# Patient Record
Sex: FEMALE | Race: WHITE | Employment: UNEMPLOYED | URBAN - METROPOLITAN AREA
[De-identification: names, ages, dates, MRNs, and addresses within clinical notes are randomized per-mention and may not be internally consistent; named-entity substitution may affect disease eponyms.]

---

## 2017-02-07 ENCOUNTER — HOSPITAL ENCOUNTER (OUTPATIENT)
Dept: GENERAL RADIOLOGY | Age: 62
Discharge: HOME | End: 2017-02-07
Attending: INTERNAL MEDICINE | Admitting: INTERNAL MEDICINE

## 2017-02-07 ENCOUNTER — OFFICE VISIT (OUTPATIENT)
Age: 62
End: 2017-02-07

## 2017-02-07 VITALS
BODY MASS INDEX: 21.19 KG/M2 | WEIGHT: 119.6 LBS | HEIGHT: 63 IN | SYSTOLIC BLOOD PRESSURE: 108 MMHG | DIASTOLIC BLOOD PRESSURE: 64 MMHG

## 2017-02-07 DIAGNOSIS — M81.0 OSTEOPOROSIS, POSTMENOPAUSAL: Primary | ICD-10-CM

## 2017-02-07 DIAGNOSIS — M81.0 OSTEOPOROSIS, POSTMENOPAUSAL: ICD-10-CM

## 2017-02-07 DIAGNOSIS — Z51.81 MEDICATION MONITORING ENCOUNTER: ICD-10-CM

## 2017-02-07 PROCEDURE — 99214 OFFICE O/P EST MOD 30 MIN: CPT | Performed by: INTERNAL MEDICINE

## 2017-02-07 PROCEDURE — 77080 DXA BONE DENSITY AXIAL: CPT | Performed by: INTERNAL MEDICINE

## 2017-02-07 PROCEDURE — 96372 THER/PROPH/DIAG INJ SC/IM: CPT | Performed by: INTERNAL MEDICINE

## 2017-02-08 ENCOUNTER — PROCEDURE VISIT (OUTPATIENT)
Age: 62
End: 2017-02-08

## 2017-02-08 DIAGNOSIS — M81.0 OSTEOPOROSIS, POSTMENOPAUSAL: Primary | ICD-10-CM

## 2017-07-21 ENCOUNTER — TELEPHONE (OUTPATIENT)
Age: 62
End: 2017-07-21

## 2017-08-09 ENCOUNTER — NURSE ONLY (OUTPATIENT)
Age: 62
End: 2017-08-09

## 2017-08-09 DIAGNOSIS — M81.0 OSTEOPOROSIS, POSTMENOPAUSAL: Primary | ICD-10-CM

## 2017-08-09 PROCEDURE — 96372 THER/PROPH/DIAG INJ SC/IM: CPT | Performed by: INTERNAL MEDICINE

## 2018-02-16 DIAGNOSIS — M81.0 OSTEOPOROSIS, POSTMENOPAUSAL: Primary | ICD-10-CM

## 2018-02-19 ENCOUNTER — TELEPHONE (OUTPATIENT)
Dept: ENDOCRINOLOGY | Age: 63
End: 2018-02-19

## 2018-02-19 NOTE — TELEPHONE ENCOUNTER
Pt called to camille her Dexa  For tomorrow 2/20/18 to 3-5-18 as she forgot about other appointments she had scheduled.

## 2018-03-05 ENCOUNTER — PROCEDURE VISIT (OUTPATIENT)
Age: 63
End: 2018-03-05

## 2018-03-05 ENCOUNTER — OFFICE VISIT (OUTPATIENT)
Age: 63
End: 2018-03-05

## 2018-03-05 ENCOUNTER — HOSPITAL ENCOUNTER (OUTPATIENT)
Dept: GENERAL RADIOLOGY | Age: 63
Discharge: HOME | End: 2018-03-05
Admitting: INTERNAL MEDICINE

## 2018-03-05 VITALS
BODY MASS INDEX: 21.72 KG/M2 | DIASTOLIC BLOOD PRESSURE: 66 MMHG | SYSTOLIC BLOOD PRESSURE: 108 MMHG | HEIGHT: 63 IN | WEIGHT: 122.6 LBS

## 2018-03-05 DIAGNOSIS — M81.0 OSTEOPOROSIS, POSTMENOPAUSAL: ICD-10-CM

## 2018-03-05 DIAGNOSIS — Z51.81 MEDICATION MONITORING ENCOUNTER: ICD-10-CM

## 2018-03-05 DIAGNOSIS — M81.0 OSTEOPOROSIS, POSTMENOPAUSAL: Primary | ICD-10-CM

## 2018-03-05 PROCEDURE — 96372 THER/PROPH/DIAG INJ SC/IM: CPT | Performed by: INTERNAL MEDICINE

## 2018-03-05 PROCEDURE — 77080 DXA BONE DENSITY AXIAL: CPT | Performed by: INTERNAL MEDICINE

## 2018-03-05 PROCEDURE — 99214 OFFICE O/P EST MOD 30 MIN: CPT | Performed by: INTERNAL MEDICINE

## 2018-03-05 NOTE — PROGRESS NOTES
[address] [place] [address] Osteoporosis and 103 [facility], [address],Suite [facility] [address]  Phone [phone]  Fax [phone]    PATIENT NAME: Faizan Guerrero  YOB: 1955  INITIAL CONSULTATION: 08/21/2006  LAST OFFICE VISIT: 02/07/2017  TODAY'S VISIT: 03/05/2018    Labs @  12/2017     [email or web address]    2.5% lidocaine and 2.5% prilocaine cream    PROBLEMS:   Osteoporosis by DXA done 10/28/2005, lowest T-score - 2.5 in the spine (L2-L4)    Foot fractures ages 20 and 36 (1974, 0), falls during strenuous movement (judo, jumping)    VFA 10/2005 showed mild wedge deformity of L1    Left humerus fracture 04/2010 (hard fracture on pavement)    BMD decreased between 2005 and 2007 despite treatment with Actonel  Natural menopause age 47 (2005)  Asperger syndrome, increased sensitivity to stimuli (especially needles)  Seasonal allergies  Osteoarthritis of the knees  Bursitis of the hips   NEW 2015: GERD usually controlled with famotidine and/or Tums    CURRENT MANAGEMENT FOR BONE HEALTH:   Calcium, diet 900-1200 mg/d + multivitamin 600 mg/d = 2540-5779 mg/d  Vitamin D, 400 IU with calcium + 400 IU with multivitamin + 1000 IU/d = 1800 IU/d    25-OH vitamin D 64 ng/mL 01/2013  Exercise, 2-3 days per week, either 30 minute walks and/or 70 minute dancing sessions  Pharmacologic therapy, Prolia 60 mg SQ twice yearly started 01/2016    PREVIOUS MEDICATIONS FOR OSTEOPOROSIS:   Actonel 35 mg weekly started 02/2006, stopped because BMD decreased while on Actonel  Forteo 01/2008-01/2010, finished 2-year course  Actonel 150 mg monthly 01/2010-10/2011, changed to alendronate for lower cost  Alendronate 70 mg weekly 10/2011-01/2016, changed to Prolia because BMD decreased    OTHER CURRENT MEDICATIONS (SELECTED): Nasacort  OTC MEDICATIONS: Aleve, loratidine, OsteoBiflex, vitamin C, vitamin E, vitamin A, fish oil concentrate, Super Digestaway, lactase    CHIEF

## 2018-08-31 DIAGNOSIS — M81.0 OSTEOPOROSIS, POSTMENOPAUSAL: Primary | ICD-10-CM

## 2018-09-19 ENCOUNTER — NURSE ONLY (OUTPATIENT)
Age: 63
End: 2018-09-19

## 2018-09-19 DIAGNOSIS — M81.0 OSTEOPOROSIS, POSTMENOPAUSAL: Primary | ICD-10-CM

## 2018-09-19 PROCEDURE — 96372 THER/PROPH/DIAG INJ SC/IM: CPT | Performed by: INTERNAL MEDICINE

## 2018-09-19 NOTE — PROGRESS NOTES
Informed patient if any signs of redness,rash,swelling or unusual symptoms occur, please contact the office. Prolia given per physician order. Prolia supplied by the physician office. It is the patient's responsibility to notify the physician office of new insurance plan prior to appointment to prevent delay in treatment. Please send a copy of the front and back of the insurance card either by fax, mail or stop by the office.

## 2019-03-26 ENCOUNTER — HOSPITAL ENCOUNTER (OUTPATIENT)
Dept: GENERAL RADIOLOGY | Age: 64
Discharge: HOME | End: 2019-03-26
Payer: COMMERCIAL

## 2019-03-26 ENCOUNTER — PROCEDURE VISIT (OUTPATIENT)
Dept: ENDOCRINOLOGY | Age: 64
End: 2019-03-26
Payer: COMMERCIAL

## 2019-03-26 ENCOUNTER — OFFICE VISIT (OUTPATIENT)
Dept: ENDOCRINOLOGY | Age: 64
End: 2019-03-26
Payer: COMMERCIAL

## 2019-03-26 VITALS
HEART RATE: 73 BPM | HEIGHT: 63 IN | SYSTOLIC BLOOD PRESSURE: 109 MMHG | DIASTOLIC BLOOD PRESSURE: 69 MMHG | WEIGHT: 118.8 LBS | OXYGEN SATURATION: 99 % | BODY MASS INDEX: 21.05 KG/M2

## 2019-03-26 DIAGNOSIS — M81.0 OSTEOPOROSIS, POSTMENOPAUSAL: ICD-10-CM

## 2019-03-26 DIAGNOSIS — M81.0 OSTEOPOROSIS, POSTMENOPAUSAL: Primary | ICD-10-CM

## 2019-03-26 DIAGNOSIS — Z51.81 MEDICATION MONITORING ENCOUNTER: ICD-10-CM

## 2019-03-26 PROCEDURE — 77080 DXA BONE DENSITY AXIAL: CPT | Performed by: INTERNAL MEDICINE

## 2019-03-26 PROCEDURE — 77080 DXA BONE DENSITY AXIAL: CPT

## 2019-03-26 PROCEDURE — 96372 THER/PROPH/DIAG INJ SC/IM: CPT | Performed by: INTERNAL MEDICINE

## 2019-03-26 PROCEDURE — 99214 OFFICE O/P EST MOD 30 MIN: CPT | Performed by: INTERNAL MEDICINE

## 2019-04-05 ENCOUNTER — TELEPHONE (OUTPATIENT)
Dept: ENDOCRINOLOGY | Age: 64
End: 2019-04-05

## 2019-04-05 NOTE — TELEPHONE ENCOUNTER
Patient wanted to know if it was decided whether she needed blood work or not. This patient will not answer on the phone until the office speaks first, because of scam calls. So when calling this patient, please say Dr. Reji Hopkins office and they will answer.

## 2019-04-08 NOTE — TELEPHONE ENCOUNTER
Left message for the patient to call the office related to lab orders which were placed on 3/2019. The office can mail to home or patient can go to any [facility] facility and the orders are in epic.

## 2019-04-08 NOTE — TELEPHONE ENCOUNTER
Patient will call Dr. Gagan Dkue office to have them fax over the most recent lab results.  Office fax number [phone] given

## 2019-04-08 NOTE — TELEPHONE ENCOUNTER
Patient returning call. Patient said she had some labs done for Dr. Emmy Connolly and wanted to know if these were sufficient for Dr. Allan Rowe because she has a needle aversion.

## 2019-05-16 LAB
ALBUMIN SERPL-MCNC: 4.3 G/DL (ref 3.5–5.7)
ANION GAP SERPL CALCULATED.3IONS-SCNC: 7 MMOL/L (ref 3–16)
BUN BLDV-MCNC: 19 MG/DL (ref 7–25)
CALCIUM SERPL-MCNC: 9.3 MG/DL (ref 8.6–10.3)
CHLORIDE BLD-SCNC: 104 MMOL/L (ref 98–110)
CO2: 27 MMOL/L (ref 21–33)
CREAT SERPL-MCNC: 0.64 MG/DL (ref 0.6–1.3)
GFR, ESTIMATED: >90 SEE NOTE.
GFR, ESTIMATED: >90 SEE NOTE.
GLUCOSE BLD-MCNC: 91 MG/DL (ref 70–100)
OSMOLALITY CALCULATION: 288 MOSM/KG (ref 278–305)
PHOSPHORUS: 3.7 MG/DL (ref 2.1–4.7)
POTASSIUM SERPL-SCNC: 4.5 MMOL/L (ref 3.5–5.3)
SODIUM BLD-SCNC: 138 MMOL/L (ref 133–146)

## 2019-05-17 LAB — VITAMIN D 25-HYDROXY: 63 NG/ML (ref 30–100)

## 2019-10-01 ENCOUNTER — TELEPHONE (OUTPATIENT)
Dept: ENDOCRINOLOGY | Age: 64
End: 2019-10-01

## 2019-10-02 ENCOUNTER — NURSE ONLY (OUTPATIENT)
Dept: ENDOCRINOLOGY | Age: 64
End: 2019-10-02
Payer: COMMERCIAL

## 2019-10-02 DIAGNOSIS — M81.0 OSTEOPOROSIS, POSTMENOPAUSAL: ICD-10-CM

## 2019-10-02 PROCEDURE — 96372 THER/PROPH/DIAG INJ SC/IM: CPT | Performed by: INTERNAL MEDICINE

## 2019-12-26 ENCOUNTER — ANESTHESIA (OUTPATIENT)
Dept: OPERATING ROOM | Age: 64
End: 2019-12-26
Payer: MEDICARE

## 2019-12-26 ENCOUNTER — HOSPITAL ENCOUNTER (OUTPATIENT)
Age: 64
Setting detail: OUTPATIENT SURGERY
Discharge: HOME | End: 2019-12-26
Attending: OPHTHALMOLOGY | Admitting: OPHTHALMOLOGY
Payer: MEDICARE

## 2019-12-26 ENCOUNTER — ANESTHESIA EVENT (OUTPATIENT)
Dept: OPERATING ROOM | Age: 64
End: 2019-12-26
Payer: MEDICARE

## 2019-12-26 VITALS
HEIGHT: 62 IN | HEART RATE: 68 BPM | WEIGHT: 118 LBS | TEMPERATURE: 97.5 F | DIASTOLIC BLOOD PRESSURE: 80 MMHG | RESPIRATION RATE: 14 BRPM | SYSTOLIC BLOOD PRESSURE: 136 MMHG | OXYGEN SATURATION: 99 % | BODY MASS INDEX: 21.71 KG/M2

## 2019-12-26 VITALS — OXYGEN SATURATION: 100 % | SYSTOLIC BLOOD PRESSURE: 109 MMHG | DIASTOLIC BLOOD PRESSURE: 75 MMHG

## 2019-12-26 PROCEDURE — 2500000003 HC RX 250 WO HCPCS: Performed by: NURSE ANESTHETIST, CERTIFIED REGISTERED

## 2019-12-26 PROCEDURE — 6360000002 HC RX W HCPCS: Performed by: NURSE ANESTHETIST, CERTIFIED REGISTERED

## 2019-12-26 PROCEDURE — 3600000003 HC SURGERY LEVEL 3 BASE: Performed by: OPHTHALMOLOGY

## 2019-12-26 PROCEDURE — 2580000003 HC RX 258: Performed by: NURSE ANESTHETIST, CERTIFIED REGISTERED

## 2019-12-26 PROCEDURE — 2500000003 HC RX 250 WO HCPCS: Performed by: OPHTHALMOLOGY

## 2019-12-26 PROCEDURE — 3700000000 HC ANESTHESIA ATTENDED CARE: Performed by: OPHTHALMOLOGY

## 2019-12-26 PROCEDURE — 6370000000 HC RX 637 (ALT 250 FOR IP): Performed by: OPHTHALMOLOGY

## 2019-12-26 PROCEDURE — 3700000001 HC ADD 15 MINUTES (ANESTHESIA): Performed by: OPHTHALMOLOGY

## 2019-12-26 PROCEDURE — V2632 POST CHMBR INTRAOCULAR LENS: HCPCS | Performed by: OPHTHALMOLOGY

## 2019-12-26 PROCEDURE — 6360000002 HC RX W HCPCS: Performed by: OPHTHALMOLOGY

## 2019-12-26 PROCEDURE — 7100000011 HC PHASE II RECOVERY - ADDTL 15 MIN: Performed by: OPHTHALMOLOGY

## 2019-12-26 PROCEDURE — 2580000003 HC RX 258: Performed by: ANESTHESIOLOGY

## 2019-12-26 PROCEDURE — 3600000013 HC SURGERY LEVEL 3 ADDTL 15MIN: Performed by: OPHTHALMOLOGY

## 2019-12-26 PROCEDURE — 7100000010 HC PHASE II RECOVERY - FIRST 15 MIN: Performed by: OPHTHALMOLOGY

## 2019-12-26 PROCEDURE — 2709999900 HC NON-CHARGEABLE SUPPLY: Performed by: OPHTHALMOLOGY

## 2019-12-26 DEVICE — ACRYSOF(R) IQ ASPHERIC IOL SP ACRYLIC FOLDABLELENS WULTRASERT(TM) DELIVERY SYSTEM UV WBLUE LIGHT FILTER. 13.0MM LENGTH 6.0MM ANTERIORASYMMETRIC BICONVEX OPTIC PLANAR HAPTICS.
Type: IMPLANTABLE DEVICE | Site: EYE | Status: FUNCTIONAL
Brand: ACRYSOF ULTRASERT

## 2019-12-26 RX ORDER — SODIUM CHLORIDE, SODIUM LACTATE, POTASSIUM CHLORIDE, CALCIUM CHLORIDE 600; 310; 30; 20 MG/100ML; MG/100ML; MG/100ML; MG/100ML
INJECTION, SOLUTION INTRAVENOUS CONTINUOUS PRN
Status: DISCONTINUED | OUTPATIENT
Start: 2019-12-26 | End: 2019-12-26 | Stop reason: SDUPTHER

## 2019-12-26 RX ORDER — SODIUM CHLORIDE, POTASSIUM CHLORIDE, CALCIUM CHLORIDE, MAGNESIUM CHLORIDE, SODIUM ACETATE, AND SODIUM CITRATE 6.4; .75; .48; .3; 3.9; 1.7 MG/ML; MG/ML; MG/ML; MG/ML; MG/ML; MG/ML
SOLUTION IRRIGATION PRN
Status: DISCONTINUED | OUTPATIENT
Start: 2019-12-26 | End: 2019-12-26 | Stop reason: ALTCHOICE

## 2019-12-26 RX ORDER — PREDNISOLONE ACETATE 10 MG/ML
SUSPENSION/ DROPS OPHTHALMIC PRN
Status: DISCONTINUED | OUTPATIENT
Start: 2019-12-26 | End: 2019-12-26 | Stop reason: ALTCHOICE

## 2019-12-26 RX ORDER — ONDANSETRON 2 MG/ML
4 INJECTION INTRAMUSCULAR; INTRAVENOUS PRN
Status: DISCONTINUED | OUTPATIENT
Start: 2019-12-26 | End: 2019-12-26 | Stop reason: HOSPADM

## 2019-12-26 RX ORDER — LIDOCAINE HYDROCHLORIDE 20 MG/ML
INJECTION, SOLUTION INFILTRATION; PERINEURAL PRN
Status: DISCONTINUED | OUTPATIENT
Start: 2019-12-26 | End: 2019-12-26 | Stop reason: SDUPTHER

## 2019-12-26 RX ORDER — PILOCARPINE HYDROCHLORIDE 20 MG/ML
SOLUTION/ DROPS OPHTHALMIC PRN
Status: DISCONTINUED | OUTPATIENT
Start: 2019-12-26 | End: 2019-12-26 | Stop reason: ALTCHOICE

## 2019-12-26 RX ORDER — DIPHENHYDRAMINE HYDROCHLORIDE 50 MG/ML
12.5 INJECTION INTRAMUSCULAR; INTRAVENOUS
Status: DISCONTINUED | OUTPATIENT
Start: 2019-12-26 | End: 2019-12-26 | Stop reason: HOSPADM

## 2019-12-26 RX ORDER — TOBRAMYCIN AND DEXAMETHASONE 3; 1 MG/ML; MG/ML
1 SUSPENSION/ DROPS OPHTHALMIC CONTINUOUS
Status: DISCONTINUED | OUTPATIENT
Start: 2019-12-26 | End: 2019-12-26 | Stop reason: HOSPADM

## 2019-12-26 RX ORDER — GUAIFENESIN AND DEXTROMETHORPHAN HYDROBROMIDE 100; 10 MG/5ML; MG/5ML
10 SOLUTION ORAL EVERY 4 HOURS PRN
COMMUNITY

## 2019-12-26 RX ORDER — SODIUM CHLORIDE, SODIUM LACTATE, POTASSIUM CHLORIDE, CALCIUM CHLORIDE 600; 310; 30; 20 MG/100ML; MG/100ML; MG/100ML; MG/100ML
INJECTION, SOLUTION INTRAVENOUS CONTINUOUS
Status: DISCONTINUED | OUTPATIENT
Start: 2019-12-26 | End: 2019-12-26 | Stop reason: HOSPADM

## 2019-12-26 RX ORDER — PROPOFOL 10 MG/ML
INJECTION, EMULSION INTRAVENOUS PRN
Status: DISCONTINUED | OUTPATIENT
Start: 2019-12-26 | End: 2019-12-26 | Stop reason: SDUPTHER

## 2019-12-26 RX ORDER — OXYCODONE HYDROCHLORIDE AND ACETAMINOPHEN 5; 325 MG/1; MG/1
2 TABLET ORAL PRN
Status: DISCONTINUED | OUTPATIENT
Start: 2019-12-26 | End: 2019-12-26 | Stop reason: HOSPADM

## 2019-12-26 RX ORDER — HYDRALAZINE HYDROCHLORIDE 20 MG/ML
5 INJECTION INTRAMUSCULAR; INTRAVENOUS EVERY 10 MIN PRN
Status: DISCONTINUED | OUTPATIENT
Start: 2019-12-26 | End: 2019-12-26 | Stop reason: HOSPADM

## 2019-12-26 RX ORDER — PREDNISOLONE ACETATE 10 MG/ML
1 SUSPENSION/ DROPS OPHTHALMIC CONTINUOUS
Status: DISCONTINUED | OUTPATIENT
Start: 2019-12-26 | End: 2019-12-26 | Stop reason: HOSPADM

## 2019-12-26 RX ORDER — MORPHINE SULFATE 10 MG/ML
2 INJECTION, SOLUTION INTRAMUSCULAR; INTRAVENOUS EVERY 5 MIN PRN
Status: DISCONTINUED | OUTPATIENT
Start: 2019-12-26 | End: 2019-12-26 | Stop reason: HOSPADM

## 2019-12-26 RX ORDER — LIDOCAINE HYDROCHLORIDE 10 MG/ML
2 INJECTION, SOLUTION INFILTRATION; PERINEURAL
Status: DISCONTINUED | OUTPATIENT
Start: 2019-12-26 | End: 2019-12-26 | Stop reason: HOSPADM

## 2019-12-26 RX ORDER — TOBRAMYCIN AND DEXAMETHASONE 3; 1 MG/ML; MG/ML
SUSPENSION/ DROPS OPHTHALMIC PRN
Status: DISCONTINUED | OUTPATIENT
Start: 2019-12-26 | End: 2019-12-26 | Stop reason: ALTCHOICE

## 2019-12-26 RX ORDER — MORPHINE SULFATE 10 MG/ML
1 INJECTION, SOLUTION INTRAMUSCULAR; INTRAVENOUS EVERY 5 MIN PRN
Status: DISCONTINUED | OUTPATIENT
Start: 2019-12-26 | End: 2019-12-26 | Stop reason: HOSPADM

## 2019-12-26 RX ORDER — TETRACAINE HYDROCHLORIDE 5 MG/ML
SOLUTION OPHTHALMIC PRN
Status: DISCONTINUED | OUTPATIENT
Start: 2019-12-26 | End: 2019-12-26 | Stop reason: ALTCHOICE

## 2019-12-26 RX ORDER — OXYCODONE HYDROCHLORIDE AND ACETAMINOPHEN 5; 325 MG/1; MG/1
1 TABLET ORAL PRN
Status: DISCONTINUED | OUTPATIENT
Start: 2019-12-26 | End: 2019-12-26 | Stop reason: HOSPADM

## 2019-12-26 RX ORDER — PROMETHAZINE HYDROCHLORIDE 25 MG/ML
6.25 INJECTION, SOLUTION INTRAMUSCULAR; INTRAVENOUS
Status: DISCONTINUED | OUTPATIENT
Start: 2019-12-26 | End: 2019-12-26 | Stop reason: HOSPADM

## 2019-12-26 RX ORDER — LABETALOL HYDROCHLORIDE 5 MG/ML
5 INJECTION, SOLUTION INTRAVENOUS EVERY 10 MIN PRN
Status: DISCONTINUED | OUTPATIENT
Start: 2019-12-26 | End: 2019-12-26 | Stop reason: HOSPADM

## 2019-12-26 RX ADMIN — Medication 0.3 ML: at 11:30

## 2019-12-26 RX ADMIN — LIDOCAINE HYDROCHLORIDE 30 MG: 20 INJECTION, SOLUTION INFILTRATION; PERINEURAL at 11:48

## 2019-12-26 RX ADMIN — Medication 0.3 ML: at 11:37

## 2019-12-26 RX ADMIN — BROMFENAC SODIUM 1 DROP: 0.7 SOLUTION/ DROPS OPHTHALMIC at 11:17

## 2019-12-26 RX ADMIN — SODIUM CHLORIDE, POTASSIUM CHLORIDE, SODIUM LACTATE AND CALCIUM CHLORIDE: 600; 310; 30; 20 INJECTION, SOLUTION INTRAVENOUS at 11:48

## 2019-12-26 RX ADMIN — PROPOFOL 65 MG: 10 INJECTION, EMULSION INTRAVENOUS at 11:48

## 2019-12-26 RX ADMIN — SODIUM CHLORIDE, POTASSIUM CHLORIDE, SODIUM LACTATE AND CALCIUM CHLORIDE: 600; 310; 30; 20 INJECTION, SOLUTION INTRAVENOUS at 11:18

## 2019-12-26 RX ADMIN — TOBRAMYCIN AND DEXAMETHASONE: 3; 1 SUSPENSION/ DROPS OPHTHALMIC at 12:35

## 2019-12-26 ASSESSMENT — PULMONARY FUNCTION TESTS
PIF_VALUE: 0

## 2019-12-26 ASSESSMENT — PAIN SCALES - GENERAL: PAINLEVEL_OUTOF10: 0

## 2019-12-26 ASSESSMENT — PAIN - FUNCTIONAL ASSESSMENT: PAIN_FUNCTIONAL_ASSESSMENT: 0-10

## 2020-04-13 ENCOUNTER — TELEPHONE (OUTPATIENT)
Dept: ENDOCRINOLOGY | Age: 65
End: 2020-04-13

## 2020-06-02 ENCOUNTER — OFFICE VISIT (OUTPATIENT)
Dept: ENDOCRINOLOGY | Age: 65
End: 2020-06-02
Payer: MEDICARE

## 2020-06-02 ENCOUNTER — HOSPITAL ENCOUNTER (OUTPATIENT)
Dept: GENERAL RADIOLOGY | Age: 65
Discharge: HOME | End: 2020-06-02
Payer: MEDICARE

## 2020-06-02 ENCOUNTER — PROCEDURE VISIT (OUTPATIENT)
Dept: ENDOCRINOLOGY | Age: 65
End: 2020-06-02

## 2020-06-02 VITALS
BODY MASS INDEX: 21.83 KG/M2 | SYSTOLIC BLOOD PRESSURE: 107 MMHG | HEIGHT: 62 IN | DIASTOLIC BLOOD PRESSURE: 61 MMHG | WEIGHT: 118.6 LBS

## 2020-06-02 PROCEDURE — 99214 OFFICE O/P EST MOD 30 MIN: CPT | Performed by: INTERNAL MEDICINE

## 2020-06-02 PROCEDURE — 77080 DXA BONE DENSITY AXIAL: CPT

## 2020-06-02 PROCEDURE — 77080 DXA BONE DENSITY AXIAL: CPT | Performed by: INTERNAL MEDICINE

## 2020-06-02 PROCEDURE — 96372 THER/PROPH/DIAG INJ SC/IM: CPT | Performed by: INTERNAL MEDICINE

## 2020-06-03 NOTE — PROGRESS NOTES
[facility]) Osteoporosis and [address] [place] [address]  Phone [phone]  Fax [phone]    PATIENT NAME: Bree Cruz  YOB: 1955  INITIAL CONSULTATION: 08/21/2006  LAST OFFICE VISIT: 03/26/2019  TODAY'S VISIT: 06/02/2020     Labs @ [facility] 05/2019     [email or web address]    2.5% lidocaine and 2.5% prilocaine cream    PROBLEMS:   Osteoporosis by DXA done 10/28/2005, lowest T-score - 2.5 in the spine (L2-L4)    Foot fractures ages 20 and 36 (1974, 0), falls during strenuous movement (judo, jumping)    VFA 10/2005 showed mild wedge deformity of L1    Left humerus fracture 04/2010 (hard fracture on pavement)    BMD decreased between 2005 and 2007 despite treatment with Actonel  Natural menopause age 47 (2005)  Asperger syndrome, increased sensitivity to stimuli (especially needles)  Seasonal allergies  Osteoarthritis of the knees  Bursitis of the hips   NEW 2015: GERD usually controlled with famotidine and/or Tums    CURRENT MANAGEMENT FOR BONE HEALTH:   Calcium, diet 900-1200 mg/d + multivitamin 600 mg/d = 5551-5983 mg/d  Vitamin D, 400 IU with calcium + 400 IU with multivitamin + 1000 IU/d = 1800 IU/d    25-OH vitamin D 63 ng/mL 05/2019  Exercise, 2-3 days per week, either 30 minute walks and/or 70 minute dancing sessions  Pharmacologic therapy, Prolia 60 mg SQ twice yearly started 01/2016    PREVIOUS MEDICATIONS FOR OSTEOPOROSIS:   Actonel 35 mg weekly started 02/2006, stopped because BMD decreased while on Actonel  Forteo 01/2008-01/2010, finished 2-year course  Actonel 150 mg monthly 01/2010-10/2011, changed to alendronate for lower cost  Alendronate 70 mg weekly 10/2011-01/2016, changed to Prolia because BMD decreased    OTHER CURRENT MEDICATIONS (SELECTED): Nasacort  OTC MEDICATIONS: Aleve, loratidine, OsteoBiflex, vitamin C, vitamin E, vitamin A, fish oil concentrate, Super Digestaway, lactase    CHIEF COMPLAINT: Here for f/u visit of osteoporosis and monitoring treatment. No new related signs or symptoms. PAST MEDICAL HISTORY, FAMILY HISTORY, SOCIAL HISTORY AND REVIEW OF SYSTEMS:  Relevant changes since last visit (see patient questionnaire of today's date). INTERVAL HISTORY:  See problem list for active/ongoing issues. She received Prolia without side effects. No falls, near-falls or fractures. Feels well overall. NEUROLOGIC EXAM: Able to rise from chair without using arms. No apparent focal motor or sensory deficit. Reflexes brisk and symmetric. Coordination appears normal.  MUSCULOSKELETAL EXAM:  Gait: Intact without difficulty. Steady without assistance. Spine: Spinal contours are normal.  No spine tenderness to palpation or percussion. Ribs and pelvis: Ribs appear normal. Two finger spaces between ribs and pelvis. BONE DENSITY: Most recent done here using Hologic equipment. I had been using L3-L4 which were the numbers I reviewed with her. After further consideration, I decided to use L1+L4 (and went back as far as I could) because of a large osteophyte between L2 and L3 that spuriously increases BMD in that region. T-scores  Initial study: 10/28/2005 spine L2-L4 -2.5 Lowest hip (left fem. neck) -1.8   Current study: 06/02/2020 spine L1+L4 -2.4 Lowest hip (left fem. neck) -2.4     The table below shows bone mineral density (grams/cm2), the appropriate measure for comparing serial scans. An increase or decrease is significant based on precision studies done at our center according to the ISCD protocol. PA spine Proximal Femur (left)   Date L1+L4 fem.  neck Trochanter Total hip   10/28/2005 --- 0.644 0.568 0.801   08/21/2007 --- 0.623 0.534 0.768   08/27/2008 --- 0.557 0.516 0.726   09/02/2009 --- 0.627 0.510 0.731   09/20/2010 --- 0.614 0.503 0.734   10/24/2011 --- 0.572 0.536 0.731   12/17/2012 0.688 0.574 0.536 0.702   12/23/2013 0.742 0.567 0.544 0.714

## 2020-06-03 NOTE — RESULT ENCOUNTER NOTE
[facility]) Osteoporosis and 103 [facility] [address]qu[address] Klein, [address] [place] [address]   Phone [phone]  Fax [phone]    NAME: Lidia Grant   : 1955   STUDY DATE: 2020     REFERRING PROVIDER: Frankie Leon MD; copy for the patient    INDICATION(S) FOR PERFORMING THE STUDY:  osteoporosis, age-related (M81.0)    CLINICAL INFORMATION PROVIDED BY THE PATIENT: 69-year-old woman. She started natural menopause at age 47. She used vaginal estrogen from  until . No history of fragility fractures. No long-term corticosteroid use. She took Actonel 2006-2008, Forteo 2008-2010, Actonel 2010-10/2011 and alendronate 10/2011-2016. Current treatment is Prolia started 2016. Family history of osteoporosis (mother, father). EQUIPMENT: Hologic Discovery. POSITIONING: Good. REGIONS OF INTEREST: Correct. ARTIFACTS: None. STUDY VALID? Yes; L1 was deleted prior to  and L2 starting in . In  I decided to use L1+L4 due to a large osteophyte between L2 and L3. T-scores  Initial study: 10/28/2005 spine L2-L4 -2.5 Lowest hip (left fem. neck) -1.8   Current study: 2020 spine L1+L4 -2.4 Lowest hip (left fem. neck) -2.4     The table below shows bone mineral density (grams/cm2), the appropriate measure for comparing serial scans. An increase or decrease is significant based on precision studies done at our center according to the ISCD protocol. PA spine Proximal Femur (left)   Date L1+L4 fem.  neck Trochanter Total hip   10/28/2005 --- 0.644 0.568 0.801   2007 --- 0.623 0.534 0.768   2008 --- 0.557 0.516 0.726   2009 --- 0.627 0.510 0.731   2010 --- 0.614 0.503 0.734   10/24/2011 --- 0.572 0.536 0.731   2012 0.688 0.574 0.536 0.702   2013 0.742 0.567 0.544 0.714   2014 0.707 0.571 0.527 0.684   2016 0.629 0.582 0.540 0.687   2017 0.644 0.559 0.544 0.695   2018 0.707 0.568

## 2020-06-26 ENCOUNTER — TELEPHONE (OUTPATIENT)
Dept: ENDOCRINOLOGY | Age: 65
End: 2020-06-26

## 2020-06-29 NOTE — TELEPHONE ENCOUNTER
Pt called back with more questions, she wants to know if this is something that would show in her dexa scan.

## 2020-06-29 NOTE — TELEPHONE ENCOUNTER
Will scoliosis show up on a Dexa scan? She is seeing PCP related to someone said she has scoliosis.   Please advise

## 2020-07-01 PROBLEM — M41.9 SCOLIOSIS: Status: ACTIVE | Noted: 2020-07-01

## 2021-10-15 NOTE — TELEPHONE ENCOUNTER
Romelia  Member ID- [id number] [facility]  Group Number- [id number]     Patient called giving new insurance information. Patient is supposed to have Prolia on 04/14. Pt cancelled phone visit with Dr. Bartolome Alvarez. Would like to come in for Prolia once new insurance is completed.
Episode of loss of consciousness

## 2023-01-03 ENCOUNTER — TELEPHONE (OUTPATIENT)
Dept: ENDOCRINOLOGY | Age: 68
End: 2023-01-03

## 2023-01-03 NOTE — TELEPHONE ENCOUNTER
Pt calling because she wanted to send Dr Zack Fonseca a [employer] message but did not want give her social security # so I instructed to pt that I will just have to put a message in her chart instead. She was asking when her next appt was for. Told pt she does not have appt that she has not been seen since June 2020. She states she shouldve been seen because she needs her Prolia injection every 6 months. Pt was very confused. She wants to let Dr Zack Fonseca know that her heartburn has gotten worse so she cut back on the hot chocolate. She takes 1200mg calcium and wants to know the max dose she can take because of her adding Tums.      # [phone]

## 2023-01-05 NOTE — TELEPHONE ENCOUNTER
She was last here 06/2020. I don't know why she has not been back. Happy to see her as a return patient. There are openings on Monday 1/9/2023.

## 2023-01-06 NOTE — TELEPHONE ENCOUNTER
Pt calling back and did not get a return call back regarding her prev calcium dose question?     She did schedule her fu appt for 2/14/23

## 2023-01-09 NOTE — TELEPHONE ENCOUNTER
AUGUSTINE: Spoke with patient related to calcium. Nurse let the patient know that normal calcium intake would be 1200 mg daily by for and/or supplement.     Also let the patient know that Dr. Sofia Blake would go over everything with her at the 2/14/2023    Her last appointment was 6/2/2020

## 2023-01-15 PROBLEM — M80.08XD VERTEBRAL FRACTURE, OSTEOPOROTIC, WITH ROUTINE HEALING, SUBSEQUENT ENCOUNTER: Status: ACTIVE | Noted: 2023-01-15

## 2023-02-14 ENCOUNTER — OFFICE VISIT (OUTPATIENT)
Dept: ENDOCRINOLOGY | Age: 68
End: 2023-02-14

## 2023-02-14 ENCOUNTER — PROCEDURE VISIT (OUTPATIENT)
Dept: ENDOCRINOLOGY | Age: 68
End: 2023-02-14

## 2023-02-14 ENCOUNTER — HOSPITAL ENCOUNTER (OUTPATIENT)
Dept: GENERAL RADIOLOGY | Age: 68
Discharge: HOME | End: 2023-02-14
Payer: MEDICARE

## 2023-02-14 VITALS
DIASTOLIC BLOOD PRESSURE: 61 MMHG | WEIGHT: 113.8 LBS | HEIGHT: 62 IN | SYSTOLIC BLOOD PRESSURE: 120 MMHG | BODY MASS INDEX: 20.94 KG/M2

## 2023-02-14 DIAGNOSIS — M81.0 OSTEOPOROSIS, POSTMENOPAUSAL: Primary | ICD-10-CM

## 2023-02-14 DIAGNOSIS — M81.0 OSTEOPOROSIS, POSTMENOPAUSAL: ICD-10-CM

## 2023-02-14 DIAGNOSIS — Z51.81 MEDICATION MONITORING ENCOUNTER: ICD-10-CM

## 2023-02-14 DIAGNOSIS — M80.08XD VERTEBRAL FRACTURE, OSTEOPOROTIC, WITH ROUTINE HEALING, SUBSEQUENT ENCOUNTER: ICD-10-CM

## 2023-02-14 PROCEDURE — 77080 DXA BONE DENSITY AXIAL: CPT

## 2023-02-14 PROCEDURE — 77080 DXA BONE DENSITY AXIAL: CPT | Performed by: INTERNAL MEDICINE

## 2023-02-14 RX ORDER — DENOSUMAB 60 MG/ML
60 INJECTION SUBCUTANEOUS ONCE
Qty: 1 ML | Refills: 0 | Status: SHIPPED | OUTPATIENT
Start: 2023-02-14 | End: 2023-02-14

## 2023-02-14 NOTE — RESULT ENCOUNTER NOTE
[facility]) Osteoporosis and [address] [facility] [address] [facility], [address] [place] [address],[place] [address]  Phone [phone]  Fax [phone]    NAME: Amie William   : 1955   STUDY DATE: 2023     REFERRING PROVIDER: Emelia Fuentes MD; copy for the patient    INDICATION(S) FOR PERFORMING THE STUDY:  osteoporosis, age-related (M81.0)    CLINICAL INFORMATION PROVIDED BY THE PATIENT: 76-year-old woman. She started natural menopause at age 47. She used vaginal estrogen from  until . No history of fragility fractures. No long-term corticosteroid use. She took Actonel 2006-2008, Forteo 2008-2010, Actonel 2010-10/2011 and alendronate 10/2011-2016. Current treatment is Prolia started 2016 - last dose 2020. Family history of osteoporosis (mother, father). EQUIPMENT: Hologic Discovery. POSITIONING: Good. REGIONS OF INTEREST: Correct. ARTIFACTS: None. STUDY VALID? Yes; L1 was deleted prior to  and L2 starting in . In  I decided to use L1+L4 due to a large osteophyte between L2 and L3. T-scores  Initial study: 10/28/2005 spine L2-L4 -2.5 Lowest hip (left fem. neck) -1.8   Current study: 2023 spine L1+L4 -2.8 Lowest hip (left fem. neck) -2.83     The table below shows bone mineral density (grams/cm2), the appropriate measure for comparing serial scans. An increase or decrease is significant based on precision studies done at our center according to the ISCD protocol. PA spine Proximal Femur (left)   Date L1+L4 fem.  neck Trochanter Total hip   10/28/2005 --- 0.644 0.568 0.801   2008 --- 0.557 0.516 0.726   2009 --- 0.627 0.510 0.731   2010 --- 0.614 0.503 0.734   2014 0.707 0.571 0.527 0.684   2016 0.629 0.582 0.540 0.687   2017 0.644 0.559 0.544 0.695   2018 0.707 0.568 0.551 0.706   2019 0.726 0.595 0.575 0.722   2020 0.777 0.587 0.572 0.727   2023 0.729 0.534 0.500 0.645 BONE DENSITY IS LOW, CONSISTENT WITH OSTEOPOROSIS. SINCE THE LAST DXA, BMD DECREASED AT ALL SITES MEASURED. Consider repeating this study in 1-2 years to assess the patient's progress. _________________________________________________   Rama Alonzo MD, Director, [facility]) Osteoporosis and [address] [place] [address]

## 2023-02-14 NOTE — PROGRESS NOTES
[facility]) Osteoporosis and 103 [facility] [address] [place] [address]  Phone [phone]  Fax [phone]    NAME: Mika Mejía BIRTH: 1955  INITIAL CONSULTATION: 08/21/2006  LAST OFFICE VISIT: 06/20/2020  TODAY'S VISIT: 02/14/2023     Labs @  06/2022    [email or web address]    2.5% lidocaine and 2.5% prilocaine cream    PROBLEMS:   Osteoporosis by DXA done 10/28/2005, lowest T-score - 2.5 in the spine (L2-L4)    Foot fractures ages 20 and 36 (1974, 0), falls during strenuous movement (judo, jumping)    VFA 10/2005 showed mild wedge deformity of L1    Left humerus fracture 04/2010 (hard fracture on pavement)    BMD decreased between 2005 and 2007 despite treatment with Actonel  Natural menopause age 47 (2005)  Asperger syndrome, increased sensitivity to stimuli (especially needles)  Seasonal allergies  Osteoarthritis of the knees  Bursitis of the hips   NEW 2015: GERD, 2023 incompletely controlled with famotidine and/or Tums    CURRENT MANAGEMENT FOR BONE HEALTH:   Calcium, diet 900-1200 mg/d + multivitamin 600 mg/d = 8370-5523 mg/d  Vitamin D, 400 IU with calcium + 400 IU with multivitamin + 1000 IU/d = 1800 IU/d    25-OH vitamin D 63 ng/mL 05/2019  Exercise, 2-3 days per week, either 30-minute walks and/or 70-minute dancing sessions  Pharmacologic therapy, Prolia 60 mg SQ twice yearly started 01/2016    PREVIOUS MEDICATIONS FOR OSTEOPOROSIS:   Actonel 35 mg weekly started 02/2006, stopped because BMD decreased while on Actonel  Forteo 01/2008-01/2010, finished 2-year course  Actonel 150 mg monthly 01/2010-10/2011, changed to alendronate for lower cost  Alendronate 70 mg weekly 10/2011-01/2016, changed to Prolia because BMD decreased    OTHER CURRENT MEDICATIONS (SELECTED): Nasacort  OTC MEDICATIONS: Aleve, loratidine, OsteoBiflex, vitamin C, vitamin E, vitamin A, fish oil concentrate, Super Digestaway, lactase    CHIEF COMPLAINT: Here for f/u visit of osteoporosis and monitoring treatment. No new related signs or symptoms. PAST MEDICAL HISTORY, FAMILY HISTORY, SOCIAL HISTORY AND REVIEW OF SYSTEMS:  Relevant changes since last visit (see patient questionnaire of today's date). INTERVAL HISTORY:  See problem list for active/ongoing issues. She received Prolia without side effects - last dose 06/2020. Somehow, she lost track of appointments here. Good news: no falls, near-falls or fractures. Feels well overall. NEUROLOGIC EXAM: Able to rise from chair without using arms. No apparent focal motor or sensory deficit. Reflexes brisk and symmetric. Coordination appears normal.  MUSCULOSKELETAL EXAM:  Gait: Intact without difficulty. Steady without assistance. Spine: Spinal contours are normal.  No spine tenderness to palpation or percussion. Ribs and pelvis: Ribs appear normal. Two finger spaces between ribs and pelvis. BONE DENSITY: Most recent done here using Hologic equipment. T-scores  Initial study: 10/28/2005 spine L2-L4 -2.5 Lowest hip (left fem. neck) -1.8   Current study: 02/14/2023 spine L1+L4 -2.8 Lowest hip (left fem. neck) -2.83     The table below shows bone mineral density (grams/cm2), the appropriate measure for comparing serial scans. An increase or decrease is significant based on precision studies done at our center according to the ISCD protocol. PA spine Proximal Femur (left)   Date L1+L4 fem. neck Trochanter Total hip   10/28/2005 --- 0.644 0.568 0.801   08/27/2008 --- 0.557 0.516 0.726   09/02/2009 --- 0.627 0.510 0.731   09/20/2010 --- 0.614 0.503 0.734   12/23/2014 0.707 0.571 0.527 0.684   01/05/2016 0.629 0.582 0.540 0.687   02/07/2017 0.644 0.559 0.544 0.695   03/05/2018 0.707 0.568 0.551 0.706   03/26/2019 0.726 0.595 0.575 0.722   06/02/2020 0.777 0.587 0.572 0.727   02/14/2023 0.729 0.534 0.500 0.645     BONE DENSITY IS LOW, CONSISTENT WITH OSTEOPOROSIS. SINCE THE LAST DXA, BMD DECREASED AT ALL SITES MEASURED.     LABS.  01/21/2013, CBC, Cr 0.7, Ca 9.2; . 01/2015, Ca 9.4, Cr 0.6. 01/2015 Ca 9.4 Cr 0.6.  12/2017 Ca 9.4 Cr 0.6. 05/2019 Ca 9.3 Cr 0.6. 06/2022 Ca 9.3 Cr 0.7. IMAGING. DXA printouts reviewed. ASSESSMENT: Osteoporosis, bone mineral density lower than desirable. Mild wedge deformity of L1 may be a previously undiagnosed vertebral fracture. She lost BMD 2899-6091 despite treatment with Actonel. She did well with Forteo 01/2008-01/2010 and continues to do well with monthly Actonel started 01/2010 except for her humerus fracture 04/2010 which involved significant trauma. BMD decreased 9404-2044, increased 5762-2226 but decreased in the spine 2558-2515 despite treatment with alendronate. She was doing well with Prolia started 01/2016 but BMD decreased off treatment, as expected (last dose 06/2020)    PLANS:  Resume Prolia 60 mg SQ twice yearly; we need to clear with her insurance. We discussed the Calcium Intake Calculator, GERD management. We discussed GERD management; I recommended discussing ongoing problems with Dr. Dasia Foster. Return appointment in 1 year with DXA. Time today: 40-54 minutes. Gopal Alonzo MD, Director, [facility]) Osteoporosis and Bone Health Services    CC: Amy Reaves MD

## 2023-02-28 ENCOUNTER — TELEPHONE (OUTPATIENT)
Dept: ENDOCRINOLOGY | Age: 68
End: 2023-02-28

## 2023-02-28 DIAGNOSIS — M81.0 OSTEOPOROSIS, POSTMENOPAUSAL: Primary | ICD-10-CM

## 2023-02-28 RX ORDER — DENOSUMAB 60 MG/ML
60 INJECTION SUBCUTANEOUS ONCE
Qty: 1 ML | Refills: 0 | Status: SHIPPED | OUTPATIENT
Start: 2023-02-28 | End: 2023-02-28

## 2023-02-28 NOTE — TELEPHONE ENCOUNTER
Your Authorization Request Has Been Approved  Your authorization request number is [id number].  If you need to add a new specialty drug covered under the medical benefit, you will need to submit a new authorization request.    Authorization Status  Approved  Authorization Number  [id number]   Authorization Start Date  02-  Authorization End Date  02-

## 2023-02-28 NOTE — TELEPHONE ENCOUNTER
Your Authorization Request Is Pending    Your request number is [id number]. Your request may require review by our clinical team. Also, if additional information is needed to make a determination, we will reach out to you via the contact information provided below. Please see below for details regarding your request.    Request Status  Pending  Request Number  [id number]    If approved, please send script to [facility]. Thanks!

## 2023-03-15 DIAGNOSIS — M81.0 OSTEOPOROSIS, POSTMENOPAUSAL: Primary | ICD-10-CM

## 2023-03-15 RX ORDER — DENOSUMAB 60 MG/ML
60 INJECTION SUBCUTANEOUS ONCE
Qty: 1 ML | Refills: 0 | Status: SHIPPED | OUTPATIENT
Start: 2023-03-15 | End: 2023-03-15

## 2023-03-24 ENCOUNTER — TELEPHONE (OUTPATIENT)
Dept: ENDOCRINOLOGY | Age: 68
End: 2023-03-24

## 2023-03-24 NOTE — TELEPHONE ENCOUNTER
Per Jhonatan Sifuentes At [facility], prolia was filled 3/14/23. Office has not received the prolia     Left message for patient to contact the office and let us know if she received the prolia at her home.     Prolia rx has not been processed due to patient will need to pay 40.00 co pay    Pharmacy to contact patient today per Arsalan Arellano

## 2023-03-29 ENCOUNTER — NURSE ONLY (OUTPATIENT)
Dept: ENDOCRINOLOGY | Age: 68
End: 2023-03-29
Payer: MEDICARE

## 2023-03-29 DIAGNOSIS — M81.0 OSTEOPOROSIS, POSTMENOPAUSAL: Primary | ICD-10-CM

## 2023-03-29 PROCEDURE — 96372 THER/PROPH/DIAG INJ SC/IM: CPT | Performed by: INTERNAL MEDICINE

## 2023-08-04 DIAGNOSIS — M81.0 OSTEOPOROSIS, POSTMENOPAUSAL: Primary | ICD-10-CM

## 2023-08-04 RX ORDER — DENOSUMAB 60 MG/ML
INJECTION SUBCUTANEOUS
Qty: 1 ML | Refills: 0 | Status: SHIPPED | OUTPATIENT
Start: 2023-08-04

## 2023-10-04 ENCOUNTER — NURSE ONLY (OUTPATIENT)
Dept: ENDOCRINOLOGY | Age: 68
End: 2023-10-04

## 2023-10-04 DIAGNOSIS — M81.0 OSTEOPOROSIS, POSTMENOPAUSAL: Primary | ICD-10-CM

## 2023-10-04 NOTE — PROGRESS NOTES
Informed patient if any signs of redness,rash,swelling or unusual symptoms occur, please contact the office. Prolia given per physician order.     pt suppiled, subq to RLQ

## 2024-03-09 DIAGNOSIS — M81.0 OSTEOPOROSIS, POSTMENOPAUSAL: ICD-10-CM

## 2024-03-11 RX ORDER — DENOSUMAB 60 MG/ML
INJECTION SUBCUTANEOUS
Qty: 1 ML | Refills: 0 | Status: SHIPPED | OUTPATIENT
Start: 2024-03-11

## 2024-03-11 NOTE — TELEPHONE ENCOUNTER
NOV- 05/07/2024        Requested Prescriptions     Pending Prescriptions Disp Refills    denosumab (PROLIA) 60 MG/ML SOSY SC injection [Pharmacy Med Name: Prolia 60 MG/ML Subcutaneous Solution Prefilled Syringe] 1 mL 0     Sig: INJECT 60MG SUBCUTANEOUSLY EVERY 6 MONTHS

## 2024-05-07 ENCOUNTER — HOSPITAL ENCOUNTER (OUTPATIENT)
Dept: GENERAL RADIOLOGY | Age: 69
Discharge: HOME | End: 2024-05-07
Payer: MEDICARE

## 2024-05-07 ENCOUNTER — OFFICE VISIT (OUTPATIENT)
Dept: ENDOCRINOLOGY | Age: 69
End: 2024-05-07
Payer: MEDICARE

## 2024-05-07 VITALS — WEIGHT: 106.2 LBS | BODY MASS INDEX: 20.05 KG/M2 | HEIGHT: 61 IN

## 2024-05-07 DIAGNOSIS — M81.0 OSTEOPOROSIS, POSTMENOPAUSAL: ICD-10-CM

## 2024-05-07 DIAGNOSIS — Z51.81 MEDICATION MONITORING ENCOUNTER: ICD-10-CM

## 2024-05-07 DIAGNOSIS — M81.0 OSTEOPOROSIS, POSTMENOPAUSAL: Primary | ICD-10-CM

## 2024-05-07 DIAGNOSIS — M80.08XD VERTEBRAL FRACTURE, OSTEOPOROTIC, WITH ROUTINE HEALING, SUBSEQUENT ENCOUNTER: ICD-10-CM

## 2024-05-07 PROCEDURE — 99215 OFFICE O/P EST HI 40 MIN: CPT | Performed by: INTERNAL MEDICINE

## 2024-05-07 PROCEDURE — G8399 PT W/DXA RESULTS DOCUMENT: HCPCS | Performed by: INTERNAL MEDICINE

## 2024-05-07 PROCEDURE — 72020 X-RAY EXAM OF SPINE 1 VIEW: CPT

## 2024-05-07 PROCEDURE — 96372 THER/PROPH/DIAG INJ SC/IM: CPT | Performed by: INTERNAL MEDICINE

## 2024-05-07 PROCEDURE — 77080 DXA BONE DENSITY AXIAL: CPT

## 2024-05-07 PROCEDURE — G8427 DOCREV CUR MEDS BY ELIG CLIN: HCPCS | Performed by: INTERNAL MEDICINE

## 2024-05-07 PROCEDURE — 3017F COLORECTAL CA SCREEN DOC REV: CPT | Performed by: INTERNAL MEDICINE

## 2024-05-07 PROCEDURE — 72070 X-RAY EXAM THORAC SPINE 2VWS: CPT

## 2024-05-07 PROCEDURE — 1036F TOBACCO NON-USER: CPT | Performed by: INTERNAL MEDICINE

## 2024-05-07 PROCEDURE — 1123F ACP DISCUSS/DSCN MKR DOCD: CPT | Performed by: INTERNAL MEDICINE

## 2024-05-07 PROCEDURE — G8420 CALC BMI NORM PARAMETERS: HCPCS | Performed by: INTERNAL MEDICINE

## 2024-05-07 PROCEDURE — 1090F PRES/ABSN URINE INCON ASSESS: CPT | Performed by: INTERNAL MEDICINE

## 2024-05-07 RX ORDER — DIPHENHYDRAMINE HCL 25 MG
25 CAPSULE ORAL EVERY 6 HOURS PRN
COMMUNITY

## 2024-05-07 RX ORDER — PSEUDOEPHEDRINE HCL 30 MG
30 TABLET ORAL EVERY 4 HOURS PRN
COMMUNITY

## 2024-05-07 RX ORDER — ACETAMINOPHEN 325 MG/1
650 TABLET ORAL EVERY 6 HOURS PRN
COMMUNITY

## 2024-05-07 RX ORDER — TIZANIDINE 2 MG/1
2 TABLET ORAL EVERY 6 HOURS PRN
COMMUNITY

## 2024-05-07 NOTE — PROGRESS NOTES
Patient supplied the Prolia.Informed patient if any signs of redness,rash,swelling or unusual symptoms occur, please contact the office. Prolia given per physician order.

## 2024-05-07 NOTE — PROGRESS NOTES
[facility] Osteoporosis and Bone Health Services  [address]  [facility] [address]  Phone [phone]  Fax [phone]    NAME: ROX MORRISON  YOB: 1955  INITIAL CONSULTATION: 08/21/2006  LAST OFFICE VISIT: 02/14/2023  TODAY'S VISIT: 05/07/2024    Labs @  01/2024    [email or web address]    2.5% lidocaine and 2.5% prilocaine cream    PROBLEMS:   Osteoporosis by DXA done 10/28/2005, lowest T-score - 2.5 in the spine (L2-L4)    Foot fractures ages 19 and 42 (1974, 1997), falls during strenuous movement (judo, jumping)    VFA 10/2005 showed mild wedge deformity of L1    Left humerus fracture 04/2010 (hard fracture on pavement)    BMD decreased between 2005 and 2007 despite treatment with Actonel  Natural menopause age 49 (2005)  Asperger syndrome, increased sensitivity to stimuli (especially needles)  Seasonal allergies  Osteoarthritis of the knees  Bursitis of the hips   NEW 2015: GERD, 2023 incompletely controlled with famotidine and/or Tums    CURRENT MANAGEMENT FOR BONE HEALTH:   Calcium, diet 900-1200 mg/d + multivitamin 600 mg/d = 8512-5545 mg/d  Vitamin D, 400 IU with calcium + 400 IU with multivitamin + 1000 IU/d = 1800 IU/d    25-OH vitamin D 63 ng/mL 05/2019  Exercise, 2-3 days per week, either 30-minute walks and/or 70-minute dancing sessions  Pharmacologic therapy, Prolia 60 mg SQ twice yearly started 01/2016, missed 6 doses 12/2020-06/2022, resumed 02/2023    PREVIOUS MEDICATIONS FOR OSTEOPOROSIS:   Actonel 35 mg weekly started 02/2006, stopped because BMD decreased while on Actonel  Forteo 01/2008-01/2010, finished 2-year course  Actonel 150 mg monthly 01/2010-10/2011, changed to alendronate for lower cost  Alendronate 70 mg weekly 10/2011-01/2016, changed to Prolia because BMD decreased    OTHER CURRENT MEDICATIONS (SELECTED): Nasacort  OTC MEDICATIONS: Aleve, loratidine, OsteoBiflex, vitamin C, vitamin E, vitamin A, fish

## 2024-06-05 LAB — VITAMIN D 25-HYDROXY: 53.8 NG/ML (ref 30–100)

## 2024-06-06 ENCOUNTER — TELEPHONE (OUTPATIENT)
Dept: ENDOCRINOLOGY | Age: 69
End: 2024-06-06

## 2024-09-15 DIAGNOSIS — M81.0 OSTEOPOROSIS, POSTMENOPAUSAL: Primary | ICD-10-CM

## 2024-09-16 RX ORDER — DENOSUMAB 60 MG/ML
INJECTION SUBCUTANEOUS
Qty: 1 ML | Refills: 0 | Status: SHIPPED | OUTPATIENT
Start: 2024-09-16

## 2024-11-08 ENCOUNTER — TELEPHONE (OUTPATIENT)
Dept: ENDOCRINOLOGY | Age: 69
End: 2024-11-08

## 2024-11-11 ENCOUNTER — TELEPHONE (OUTPATIENT)
Dept: ENDOCRINOLOGY | Age: 69
End: 2024-11-11

## 2024-11-12 NOTE — TELEPHONE ENCOUNTER
Prolia approved for Pharmacy    Your Authorization Request Has Been Approved    Your authorization request number is [id number].      Authorization Start Date 11-     Authorization End Date 11-     If this requires a response please respond to the pool ( P [facility] MEDICATION PRE-AUTH).      Thank you please advise patient.

## 2024-11-13 ENCOUNTER — TELEPHONE (OUTPATIENT)
Dept: ENDOCRINOLOGY | Age: 69
End: 2024-11-13

## 2024-11-13 NOTE — TELEPHONE ENCOUNTER
Pt called back read verbatim pt asking for a call back asking if she should or should not come in for her prolia tomorrow

## 2024-11-13 NOTE — TELEPHONE ENCOUNTER
Spoke with [facility] regarding patient's prolia. [facility] stated plan exceded limitations. Can be another 24-48 hours to process. LVM for patient to return call regarding prolia from [facility].

## 2024-11-22 ENCOUNTER — NURSE ONLY (OUTPATIENT)
Dept: ENDOCRINOLOGY | Age: 69
End: 2024-11-22
Payer: MEDICARE

## 2024-11-22 DIAGNOSIS — M81.0 OSTEOPOROSIS, POSTMENOPAUSAL: Primary | ICD-10-CM

## 2024-11-22 PROCEDURE — 96372 THER/PROPH/DIAG INJ SC/IM: CPT | Performed by: INTERNAL MEDICINE

## 2025-04-29 ENCOUNTER — TELEPHONE (OUTPATIENT)
Dept: ENDOCRINOLOGY | Age: 70
End: 2025-04-29

## 2025-04-30 ENCOUNTER — TELEPHONE (OUTPATIENT)
Dept: ENDOCRINOLOGY | Age: 70
End: 2025-04-30

## 2025-04-30 DIAGNOSIS — M81.0 OSTEOPOROSIS, POSTMENOPAUSAL: ICD-10-CM

## 2025-04-30 NOTE — TELEPHONE ENCOUNTER
Duplicate Request    Prolia approved for Pharmacy     Your Authorization Request Has Been Approved    Your authorization request number is [id number].       Authorization Start Date 11-      Authorization End Date 11-      If this requires a response please respond to the pool ( [facility] MEDICATION PRE-AUTH).       Thank you please advise patient.

## 2025-05-01 RX ORDER — DENOSUMAB 60 MG/ML
60 INJECTION SUBCUTANEOUS ONCE
Qty: 1 ML | Refills: 0 | Status: SHIPPED | OUTPATIENT
Start: 2025-05-01 | End: 2025-05-01

## 2025-05-08 ENCOUNTER — TELEPHONE (OUTPATIENT)
Dept: ENDOCRINOLOGY | Age: 70
End: 2025-05-08

## 2025-05-08 NOTE — TELEPHONE ENCOUNTER
[facility] of [facility]13/25 Tuesday for Prolia

## 2025-06-02 ENCOUNTER — OFFICE VISIT (OUTPATIENT)
Dept: ENDOCRINOLOGY | Age: 70
End: 2025-06-02
Payer: MEDICARE

## 2025-06-02 ENCOUNTER — HOSPITAL ENCOUNTER (OUTPATIENT)
Dept: GENERAL RADIOLOGY | Age: 70
Discharge: HOME | End: 2025-06-02
Payer: MEDICARE

## 2025-06-02 VITALS — HEIGHT: 61 IN | WEIGHT: 114.2 LBS | BODY MASS INDEX: 21.56 KG/M2

## 2025-06-02 DIAGNOSIS — M81.0 OSTEOPOROSIS, POSTMENOPAUSAL: ICD-10-CM

## 2025-06-02 DIAGNOSIS — Z51.81 MEDICATION MONITORING ENCOUNTER: ICD-10-CM

## 2025-06-02 DIAGNOSIS — M81.0 OSTEOPOROSIS, POSTMENOPAUSAL: Primary | ICD-10-CM

## 2025-06-02 DIAGNOSIS — M80.08XD VERTEBRAL FRACTURE, OSTEOPOROTIC, WITH ROUTINE HEALING, SUBSEQUENT ENCOUNTER: ICD-10-CM

## 2025-06-02 PROCEDURE — 4004F PT TOBACCO SCREEN RCVD TLK: CPT | Performed by: INTERNAL MEDICINE

## 2025-06-02 PROCEDURE — 1123F ACP DISCUSS/DSCN MKR DOCD: CPT | Performed by: INTERNAL MEDICINE

## 2025-06-02 PROCEDURE — 99214 OFFICE O/P EST MOD 30 MIN: CPT | Performed by: INTERNAL MEDICINE

## 2025-06-02 PROCEDURE — 77080 DXA BONE DENSITY AXIAL: CPT | Performed by: INTERNAL MEDICINE

## 2025-06-02 PROCEDURE — 77080 DXA BONE DENSITY AXIAL: CPT

## 2025-06-02 PROCEDURE — G8420 CALC BMI NORM PARAMETERS: HCPCS | Performed by: INTERNAL MEDICINE

## 2025-06-02 PROCEDURE — G8399 PT W/DXA RESULTS DOCUMENT: HCPCS | Performed by: INTERNAL MEDICINE

## 2025-06-02 PROCEDURE — 1090F PRES/ABSN URINE INCON ASSESS: CPT | Performed by: INTERNAL MEDICINE

## 2025-06-02 PROCEDURE — 1159F MED LIST DOCD IN RCRD: CPT | Performed by: INTERNAL MEDICINE

## 2025-06-02 PROCEDURE — G8427 DOCREV CUR MEDS BY ELIG CLIN: HCPCS | Performed by: INTERNAL MEDICINE

## 2025-06-02 PROCEDURE — 3017F COLORECTAL CA SCREEN DOC REV: CPT | Performed by: INTERNAL MEDICINE

## 2025-06-02 PROCEDURE — 96372 THER/PROPH/DIAG INJ SC/IM: CPT | Performed by: INTERNAL MEDICINE

## 2025-06-02 NOTE — PROGRESS NOTES
Patient presents for a prolia injection. Pt denies any prior adverse reactions. Prolia 60 mg given SubQ in the stomach with no complications. Patient tolerated well. Patient to return to office in 6 months for next injection.

## 2025-06-02 NOTE — PROGRESS NOTES
[facility]  [address]  [facility] [address]  Phone [phone]  Fax [phone]    NAME: ROX MORRISON  YOB: 1955  INITIAL CONSULTATION: 08/21/2006    LAST OFFICE VISIT: 05/07/2024  TODAY'S VISIT: 06/02/2025    Labs @  06/2024    [email or web address]    2.5% lidocaine and 2.5% prilocaine cream    PROBLEMS:   Osteoporosis by DXA done 10/28/2005, lowest T-score - 2.5 in the spine (L2-L4)    Foot fractures ages 19 and 42 (1974, 1997), falls during strenuous movement (judo, jumping)    VFA 10/2005 showed mild wedge deformity of L1    Left humerus fracture 04/2010 (hard fall on pavement)    BMD decreased between 2005 and 2007 despite treatment with Actonel  Natural menopause age 49 (2005)  Asperger syndrome, increased sensitivity to stimuli (especially needles)    CURRENT MANAGEMENT FOR BONE HEALTH:   Calcium, diet 900-1200 mg/d + multivitamin 600 mg/d = 8940-4933 mg/d  Vitamin D, 400 IU with calcium + 400 IU with multivitamin + 1000 IU/d = 1800 IU/d    25-OH vitamin D 54 ng/mL 06/2024  Exercise, 2-3 days per week, either 30-minute walks and/or 70-minute dancing sessions  Pharmacologic therapy, Prolia 60 mg SQ twice yearly started 01/2016, missed 6 doses 12/2020-06/2022, resumed 02/2023    PREVIOUS MEDICATIONS FOR OSTEOPOROSIS:   Actonel 35 mg weekly started 02/2006, stopped because BMD decreased while on Actonel  Forteo 01/2008-01/2010, finished 2-year course  Actonel 150 mg monthly 01/2010-10/2011, changed to alendronate for lower cost  Alendronate 70 mg weekly 10/2011-01/2016, changed to Prolia because BMD decreased    OTHER CURRENT MEDICATIONS (SELECTED): Nasacort  OTC MEDICATIONS: Aleve, loratidine, OsteoBiflex, vitamin C, vitamin E, vitamin A, fish oil concentrate, Super [place], lactase    CHIEF COMPLAINT: Here for f/u visit of osteoporosis and monitoring treatment.  No new related signs or

## (undated) DEVICE — SURGICAL PROCEDURE PACK EYE CLERMONT

## (undated) DEVICE — GLOVE ORANGE PI 8   MSG9080

## (undated) DEVICE — SOLUTION IV IRRIG WATER 1000ML POUR BRL 2F7114

## (undated) DEVICE — 1010 S-DRAPE TOWEL DRAPE 10/BX: Brand: STERI-DRAPE™

## (undated) DEVICE — GAUZE,SPONGE,4"X4",8PLY,STRL,LF,10/TRAY: Brand: MEDLINE

## (undated) DEVICE — Device

## (undated) DEVICE — 6 ML SYRINGE LUER-LOCK TIP: Brand: MONOJECT

## (undated) DEVICE — NEEDLE HYPO 25GA L1.5IN BLU POLYPR HUB S STL REG BVL STR

## (undated) DEVICE — CANNULA NSL 13FT TUBE AD ETCO2 DIV SAMP M

## (undated) DEVICE — PREP SOL PVP IODINE 4%  4 OZ/BTL